# Patient Record
Sex: FEMALE | Race: WHITE | Employment: FULL TIME | URBAN - METROPOLITAN AREA
[De-identification: names, ages, dates, MRNs, and addresses within clinical notes are randomized per-mention and may not be internally consistent; named-entity substitution may affect disease eponyms.]

---

## 2017-12-01 ENCOUNTER — TELEPHONE (OUTPATIENT)
Dept: SURGERY | Age: 47
End: 2017-12-01

## 2019-05-31 LAB — CREATININE, EXTERNAL: 0.87

## 2020-06-26 VITALS
RESPIRATION RATE: 18 BRPM | TEMPERATURE: 97.9 F | HEIGHT: 63 IN | DIASTOLIC BLOOD PRESSURE: 86 MMHG | WEIGHT: 190 LBS | HEART RATE: 79 BPM | SYSTOLIC BLOOD PRESSURE: 140 MMHG | BODY MASS INDEX: 33.66 KG/M2 | OXYGEN SATURATION: 97 %

## 2020-06-26 PROBLEM — J30.1 ALLERGIC RHINITIS DUE TO POLLEN: Status: ACTIVE | Noted: 2020-06-26

## 2020-06-26 PROBLEM — J34.3 HYPERTROPHY OF NASAL TURBINATES: Status: ACTIVE | Noted: 2020-06-26

## 2020-06-26 PROBLEM — J34.2 DEVIATED NASAL SEPTUM: Status: ACTIVE | Noted: 2020-06-26

## 2020-07-16 ENCOUNTER — OP HISTORICAL/CONVERTED ENCOUNTER (OUTPATIENT)
Dept: OTHER | Age: 50
End: 2020-07-16

## 2020-08-11 ENCOUNTER — OFFICE VISIT (OUTPATIENT)
Dept: ENT CLINIC | Age: 50
End: 2020-08-11
Payer: COMMERCIAL

## 2020-08-11 VITALS
BODY MASS INDEX: 33.27 KG/M2 | SYSTOLIC BLOOD PRESSURE: 130 MMHG | DIASTOLIC BLOOD PRESSURE: 80 MMHG | HEART RATE: 106 BPM | WEIGHT: 187.8 LBS | OXYGEN SATURATION: 100 % | HEIGHT: 63 IN | TEMPERATURE: 98.2 F

## 2020-08-11 DIAGNOSIS — J34.3 HYPERTROPHY OF NASAL TURBINATES: ICD-10-CM

## 2020-08-11 DIAGNOSIS — J34.3 HYPERTROPHY OF BOTH INFERIOR NASAL TURBINATES: Primary | ICD-10-CM

## 2020-08-11 PROCEDURE — 99212 OFFICE O/P EST SF 10 MIN: CPT | Performed by: OTOLARYNGOLOGY

## 2020-08-11 RX ORDER — BUPROPION HYDROCHLORIDE 150 MG/1
TABLET, EXTENDED RELEASE ORAL 2 TIMES DAILY
COMMUNITY
End: 2020-11-10

## 2020-08-11 RX ORDER — AZELASTINE 1 MG/ML
SPRAY, METERED NASAL
Qty: 1 BOTTLE | Refills: 2 | Status: SHIPPED | OUTPATIENT
Start: 2020-08-11 | End: 2020-11-10

## 2020-08-11 RX ORDER — ARIPIPRAZOLE 5 MG/1
TABLET ORAL DAILY
COMMUNITY
End: 2020-10-26

## 2020-08-11 NOTE — PROGRESS NOTES
Subjective:    Micah Pennington   45 y.o.   1970      S/P Bilateral IT submucosal reduction. She reports improved nasal airway flow. No crusting or bleeding. Review of Systems  Review of Systems   Constitutional: Negative. HENT: Negative. Eyes: Negative. Respiratory: Negative. Cardiovascular: Negative. Gastrointestinal: Negative. Musculoskeletal: Negative. Skin: Negative. Neurological: Negative. Endo/Heme/Allergies: Negative. Psychiatric/Behavioral: Negative. Objective:     Visit Vitals  /80 (BP 1 Location: Left arm, BP Patient Position: Sitting)   Pulse (!) 106   Temp 98.2 °F (36.8 °C)   Ht 5' 3\" (1.6 m)   Wt 187 lb 12.8 oz (85.2 kg)   LMP 02/01/2014   SpO2 100%   BMI 33.27 kg/m²      Physical Exam  Vitals signs reviewed. Constitutional:       General: She is awake. Appearance: Normal appearance. She is well-developed. HENT:      Nose: Nose normal.      Comments: Bilateral inferior turbinate mucosal reduction seen on anterior rhinoscopy. No crust nor granulation tissue present. Mouth/Throat:      Lips: Pink. Mouth: Mucous membranes are moist.      Pharynx: Oropharynx is clear. Uvula midline. Neurological:      Mental Status: She is alert. Psychiatric:         Behavior: Behavior is cooperative. Assessment/Plan:   No diagnosis found.      Orders Placed This Encounter    ARIPiprazole (Abilify) 5 mg tablet    buPROPion SR (WELLBUTRIN SR) 150 mg SR tablet

## 2020-08-18 VITALS
HEIGHT: 63 IN | WEIGHT: 186 LBS | DIASTOLIC BLOOD PRESSURE: 68 MMHG | BODY MASS INDEX: 32.96 KG/M2 | SYSTOLIC BLOOD PRESSURE: 122 MMHG

## 2020-08-18 PROBLEM — E55.9 VITAMIN D DEFICIENCY: Status: ACTIVE | Noted: 2020-08-18

## 2020-08-18 PROBLEM — F32.A DEPRESSIVE DISORDER: Status: ACTIVE | Noted: 2020-08-18

## 2020-08-18 RX ORDER — TROSPIUM CHLORIDE 20 MG/1
20 TABLET, FILM COATED ORAL
COMMUNITY
End: 2021-07-15

## 2020-08-18 RX ORDER — TRAZODONE HYDROCHLORIDE 50 MG/1
TABLET ORAL
COMMUNITY
End: 2020-11-10

## 2020-10-02 RX ORDER — CETIRIZINE HCL 10 MG
TABLET ORAL
Qty: 90 TAB | Refills: 99 | Status: SHIPPED | OUTPATIENT
Start: 2020-10-02 | End: 2020-11-10

## 2020-10-26 RX ORDER — ARIPIPRAZOLE 5 MG/1
TABLET ORAL
Qty: 90 TAB | Refills: 1 | Status: SHIPPED | OUTPATIENT
Start: 2020-10-26 | End: 2020-11-10 | Stop reason: SDUPTHER

## 2020-10-26 RX ORDER — HYDROCHLOROTHIAZIDE 25 MG/1
TABLET ORAL
Qty: 90 TAB | Refills: 0 | Status: SHIPPED | OUTPATIENT
Start: 2020-10-26 | End: 2020-11-10 | Stop reason: SDUPTHER

## 2020-10-26 RX ORDER — BUPROPION HYDROCHLORIDE 150 MG/1
TABLET ORAL
Qty: 90 TAB | Refills: 1 | Status: SHIPPED | OUTPATIENT
Start: 2020-10-26 | End: 2020-11-10 | Stop reason: SDUPTHER

## 2020-11-10 ENCOUNTER — OFFICE VISIT (OUTPATIENT)
Dept: PRIMARY CARE CLINIC | Age: 50
End: 2020-11-10
Payer: COMMERCIAL

## 2020-11-10 VITALS
HEIGHT: 63 IN | TEMPERATURE: 97.5 F | OXYGEN SATURATION: 98 % | BODY MASS INDEX: 34.02 KG/M2 | WEIGHT: 192 LBS | SYSTOLIC BLOOD PRESSURE: 122 MMHG | HEART RATE: 95 BPM | RESPIRATION RATE: 16 BRPM | DIASTOLIC BLOOD PRESSURE: 84 MMHG

## 2020-11-10 DIAGNOSIS — K21.9 GASTROESOPHAGEAL REFLUX DISEASE WITHOUT ESOPHAGITIS: ICD-10-CM

## 2020-11-10 DIAGNOSIS — F51.01 PRIMARY INSOMNIA: ICD-10-CM

## 2020-11-10 DIAGNOSIS — Z23 ENCOUNTER FOR IMMUNIZATION: ICD-10-CM

## 2020-11-10 DIAGNOSIS — E66.9 CLASS 1 OBESITY WITH SERIOUS COMORBIDITY AND BODY MASS INDEX (BMI) OF 34.0 TO 34.9 IN ADULT, UNSPECIFIED OBESITY TYPE: ICD-10-CM

## 2020-11-10 DIAGNOSIS — F33.1 MODERATE EPISODE OF RECURRENT MAJOR DEPRESSIVE DISORDER (HCC): ICD-10-CM

## 2020-11-10 DIAGNOSIS — M25.512 ACUTE PAIN OF LEFT SHOULDER: ICD-10-CM

## 2020-11-10 DIAGNOSIS — E55.9 VITAMIN D DEFICIENCY: ICD-10-CM

## 2020-11-10 DIAGNOSIS — I10 ESSENTIAL HYPERTENSION: Primary | ICD-10-CM

## 2020-11-10 PROCEDURE — 99214 OFFICE O/P EST MOD 30 MIN: CPT | Performed by: NURSE PRACTITIONER

## 2020-11-10 PROCEDURE — 90756 CCIIV4 VACC ABX FREE IM: CPT

## 2020-11-10 PROCEDURE — 90471 IMMUNIZATION ADMIN: CPT | Performed by: NURSE PRACTITIONER

## 2020-11-10 RX ORDER — TEMAZEPAM 15 MG/1
15 CAPSULE ORAL
Qty: 30 CAP | Refills: 0 | Status: SHIPPED | OUTPATIENT
Start: 2020-11-10 | End: 2020-12-15 | Stop reason: SDUPTHER

## 2020-11-10 RX ORDER — OMEPRAZOLE 40 MG/1
40 CAPSULE, DELAYED RELEASE ORAL DAILY
Qty: 90 CAP | Refills: 1 | Status: SHIPPED | OUTPATIENT
Start: 2020-11-10 | End: 2021-07-15 | Stop reason: SDUPTHER

## 2020-11-10 RX ORDER — HYDROCHLOROTHIAZIDE 25 MG/1
TABLET ORAL
Qty: 90 TAB | Refills: 1 | Status: SHIPPED | OUTPATIENT
Start: 2020-11-10 | End: 2021-07-15 | Stop reason: SDUPTHER

## 2020-11-10 RX ORDER — ARIPIPRAZOLE 5 MG/1
5 TABLET ORAL DAILY
Qty: 90 TAB | Refills: 1 | Status: SHIPPED | OUTPATIENT
Start: 2020-11-10 | End: 2021-07-15 | Stop reason: SDUPTHER

## 2020-11-10 RX ORDER — BUPROPION HYDROCHLORIDE 150 MG/1
TABLET ORAL
Qty: 90 TAB | Refills: 1 | Status: SHIPPED | OUTPATIENT
Start: 2020-11-10 | End: 2021-07-15 | Stop reason: SDUPTHER

## 2020-11-10 NOTE — PROGRESS NOTES
HISTORY OF PRESENT ILLNESS  Micah Bernstein is a 45 y.o. female presents to the office for medication refill and lab work  Micah presents to the office for medication refill. 1. Depression: Patient reported that her abilify is helping her with her symptoms. She is also taking wellbutrin. Patient this combination is working well for her mood. 2. Hypertension: Patients BP is well controlled on HCTZ. 3. GERD: Patient reported that her acid reflux is well controlled on omeprazole. Hx of hiatal hernia. 4. Allergic Rhinitis: Patient reported zyrtec works well for her allergies. Has sinus issues and has had sinus surgery in past.     5. Insomnia: Patient reported that trazodone is not working for her insomnia so she stopped taking it. Patient notes that she is sleeping 7 hours a night and feels pretty well rested from this sleep. She notes that she struggles to sleep some nights. 6. Vitamin D:  Patient has not been using her supplement consistently. Has hx of low vitamin D.     7. Shoulder pain:  Patient c/o left shoulder pain for months. Has been seen at urgent care center with negative XR. Has been on nsaids and steroids with some relief but the pain returns. Reduce ROM, unable to lift arm above shoulder height. Denies known injury.    Vitals:    11/10/20 1457   BP: 122/84   Pulse: 95   Resp: 16   Temp: 97.5 °F (36.4 °C)   TempSrc: Temporal   SpO2: 98%   Weight: 192 lb (87.1 kg)   Height: 5' 3\" (1.6 m)     Patient Active Problem List   Diagnosis Code    HTN (hypertension) I10    Morbid obesity ([facility] [address] E66.01    GERD (gastroesophageal reflux disease) K21.9    DJD (degenerative joint disease) M19.90    Bariatric surgery status Z98.84    Hypertrophy of nasal turbinates J34.3    Allergic rhinitis due to pollen J30.1    Deviated nasal septum J34.2    Depressive disorder F32.9    Vitamin D deficiency E55.9    Primary insomnia F51.01    Moderate episode of recurrent major depressive disorder [facility]) F33.1     Patient Active Problem List    Diagnosis Date Noted    Primary insomnia 11/10/2020    Moderate episode of recurrent major depressive disorder ([facility] [address] 11/10/2020    Depressive disorder 08/18/2020    Vitamin D deficiency 08/18/2020    Hypertrophy of nasal turbinates 06/26/2020    Allergic rhinitis due to pollen 06/26/2020    Deviated nasal septum 06/26/2020    Bariatric surgery status 02/24/2015    Morbid obesity ([facility] [address] 06/23/2013    GERD (gastroesophageal reflux disease) 06/23/2013    DJD (degenerative joint disease) 06/23/2013    HTN (hypertension)      Current Outpatient Medications   Medication Sig Dispense Refill    temazepam (RESTORIL) 15 mg capsule Take 1 Cap by mouth nightly as needed for Sleep. Max Daily Amount: 15 mg. 30 Cap 0    hydroCHLOROthiazide (HYDRODIURIL) 25 mg tablet TAKE 1 TABLET BY MOUTH EVERY DAY 90 Tab 1    buPROPion XL (WELLBUTRIN XL) 150 mg tablet TAKE 1 TABLET BY MOUTH EVERY DAY 90 Tab 1    ARIPiprazole (ABILIFY) 5 mg tablet Take 1 Tab by mouth daily. 90 Tab 1    omeprazole (PRILOSEC) 40 mg capsule Take 1 Cap by mouth daily. 90 Cap 1    trospium (SANCTURA) 20 mg tablet Take 20 mg by mouth Before breakfast, lunch, and dinner.  Calcium-Cholecalciferol, D3, (CALCIUM 600 WITH VITAMIN D3) 600 mg(1,500mg) -400 unit cap Take  by mouth daily.  ergocalciferol (VITAMIN D2) 50,000 unit capsule Take 50,000 Units by mouth every seven (7) days. EVERY Wednesday.        No Known Allergies  Past Medical History:   Diagnosis Date    Allergic rhinitis due to pollen 6/26/2020    Anemia NEC     Anxiety     Depression     Depressive disorder 8/18/2020    Deviated nasal septum 6/26/2020    GERD (gastroesophageal reflux disease)     HTN (hypertension)     Hypertrophy of nasal turbinates 6/26/2020    Morbid obesity (HCC)     Nasal sinus congestion 6/26/2020    Other ill-defined conditions(799.89)     HIATAL HERNIA    Psychiatric disorder     depression    Vitamin D deficiency 8/18/2020     Past Surgical History:   Procedure Laterality Date    HX GASTRECTOMY  1/30/14    lap sleeve gastrectomy by Dr Mary Lira (ABOUT 79 LBS. WEIGHT LOSS)    HX GI      upper endoscopy    HX HEENT      RHINOPLASTY    HX HERNIA REPAIR  1/30/14    lap repair paraesophageal hernia by Dr Cricket Tran  5/27/16    by Dr Marika Mcleod Right     foot/ PLANTAR FASCITIS    HX OTHER SURGICAL      sinus     Family History   Problem Relation Age of Onset    Cancer Mother         lung    Hypertension Father     Heart Disease Father         PACEMAKER    Diabetes Other     Anesth Problems Neg Hx      Social History     Tobacco Use    Smoking status: Never Smoker    Smokeless tobacco: Never Used   Substance Use Topics    Alcohol use: Yes     Comment: 2 MONTH           Review of Systems   Constitutional: Negative for malaise/fatigue and weight loss. Eyes: Negative for blurred vision and double vision. Respiratory: Negative for shortness of breath. Cardiovascular: Negative for chest pain, palpitations and leg swelling. Gastrointestinal: Negative for diarrhea, nausea and vomiting. Musculoskeletal: Positive for joint pain (left shoulder). Negative for neck pain. Skin: Negative. Neurological: Negative for dizziness, weakness and headaches. Psychiatric/Behavioral: Negative for depression. The patient has insomnia. The patient is not nervous/anxious. Physical Exam  Constitutional:       Appearance: Normal appearance. She is normal weight. HENT:      Head: Normocephalic. Eyes:      Extraocular Movements: Extraocular movements intact. Conjunctiva/sclera: Conjunctivae normal.      Pupils: Pupils are equal, round, and reactive to light. Neck:      Musculoskeletal: Normal range of motion and neck supple. Cardiovascular:      Rate and Rhythm: Normal rate and regular rhythm. Pulses: Normal pulses.       Heart sounds: Normal heart sounds. Pulmonary:      Effort: Pulmonary effort is normal.      Breath sounds: Normal breath sounds. Musculoskeletal: Normal range of motion. Skin:     General: Skin is warm and dry. Neurological:      General: No focal deficit present. Mental Status: She is alert and oriented to person, place, and time. Psychiatric:         Mood and Affect: Mood normal.           ASSESSMENT and PLAN    Diagnoses and all orders for this visit:    1. Essential hypertension  Comments:  stable on HCTZ. Orders:  -     hydroCHLOROthiazide (HYDRODIURIL) 25 mg tablet; TAKE 1 TABLET BY MOUTH EVERY DAY  -     METABOLIC PANEL, COMPREHENSIVE    2. Gastroesophageal reflux disease without esophagitis  -     omeprazole (PRILOSEC) 40 mg capsule; Take 1 Cap by mouth daily.  -     CBC WITH AUTOMATED DIFF    3. Primary insomnia  Comments:  Trial temazepam for sleep. Has tried and failed trazadone and OTC meds. Orders:  -     temazepam (RESTORIL) 15 mg capsule; Take 1 Cap by mouth nightly as needed for Sleep. Max Daily Amount: 15 mg.    4. Moderate episode of recurrent major depressive disorder ([facility] [address]  Comments:  continue wellbutrin and abilify. Orders:  -     buPROPion XL (WELLBUTRIN XL) 150 mg tablet; TAKE 1 TABLET BY MOUTH EVERY DAY  -     ARIPiprazole (ABILIFY) 5 mg tablet; Take 1 Tab by mouth daily.  -     CBC WITH AUTOMATED DIFF    5. Vitamin D deficiency  -     VITAMIN D, 25 HYDROXY    6. Class 1 obesity with serious comorbidity and body mass index (BMI) of 34.0 to 34.9 in adult, unspecified obesity type  -     LIPID PANEL    7. Encounter for immunization  -     INFLUENZA VACCINE (CCIIV4 VACCINE ANTIBIO FREE 0.5 ML)  -     [place] IMMUNIZ ADMIN,1 SINGLE/COMB VAC/TOXOID    8. Acute pain of left shoulder  Comments:  [facility], she will make her own appointment.         Last PDMP Maile Dietz as Reviewed:  Review User Review Instant Review Result   Andrés Jeanmarie 11/10/2020  3:19 PM Reviewed PDMP [1] Jam Dukes, NP

## 2020-11-11 LAB
25(OH)D3+25(OH)D2 SERPL-MCNC: 23.6 NG/ML (ref 30–100)
ALBUMIN SERPL-MCNC: 4.5 G/DL (ref 3.8–4.9)
ALBUMIN/GLOB SERPL: 1.7 {RATIO} (ref 1.2–2.2)
ALP SERPL-CCNC: 98 IU/L (ref 39–117)
ALT SERPL-CCNC: 23 IU/L (ref 0–32)
AST SERPL-CCNC: 23 IU/L (ref 0–40)
BASOPHILS # BLD AUTO: 0 X10E3/UL (ref 0–0.2)
BASOPHILS NFR BLD AUTO: 1 %
BILIRUB SERPL-MCNC: 0.6 MG/DL (ref 0–1.2)
BUN SERPL-MCNC: 14 MG/DL (ref 6–24)
BUN/CREAT SERPL: 14 (ref 9–23)
CALCIUM SERPL-MCNC: 9.7 MG/DL (ref 8.7–10.2)
CHLORIDE SERPL-SCNC: 100 MMOL/L (ref 96–106)
CHOLEST SERPL-MCNC: 199 MG/DL (ref 100–199)
CO2 SERPL-SCNC: 26 MMOL/L (ref 20–29)
CREAT SERPL-MCNC: 0.99 MG/DL (ref 0.57–1)
EOSINOPHIL # BLD AUTO: 0.1 X10E3/UL (ref 0–0.4)
EOSINOPHIL NFR BLD AUTO: 1 %
ERYTHROCYTE [DISTWIDTH] IN BLOOD BY AUTOMATED COUNT: 13.5 % (ref 11.7–15.4)
GLOBULIN SER CALC-MCNC: 2.7 G/DL (ref 1.5–4.5)
GLUCOSE SERPL-MCNC: 90 MG/DL (ref 65–99)
HCT VFR BLD AUTO: 39.1 % (ref 34–46.6)
HDLC SERPL-MCNC: 112 MG/DL
HGB BLD-MCNC: 13.5 G/DL (ref 11.1–15.9)
IMM GRANULOCYTES # BLD AUTO: 0 X10E3/UL (ref 0–0.1)
IMM GRANULOCYTES NFR BLD AUTO: 0 %
LDLC SERPL CALC-MCNC: 73 MG/DL (ref 0–99)
LYMPHOCYTES # BLD AUTO: 2.1 X10E3/UL (ref 0.7–3.1)
LYMPHOCYTES NFR BLD AUTO: 40 %
MCH RBC QN AUTO: 32.5 PG (ref 26.6–33)
MCHC RBC AUTO-ENTMCNC: 34.5 G/DL (ref 31.5–35.7)
MCV RBC AUTO: 94 FL (ref 79–97)
MONOCYTES # BLD AUTO: 0.5 X10E3/UL (ref 0.1–0.9)
MONOCYTES NFR BLD AUTO: 9 %
NEUTROPHILS # BLD AUTO: 2.5 X10E3/UL (ref 1.4–7)
NEUTROPHILS NFR BLD AUTO: 49 %
PLATELET # BLD AUTO: 267 X10E3/UL (ref 150–450)
POTASSIUM SERPL-SCNC: 3.9 MMOL/L (ref 3.5–5.2)
PROT SERPL-MCNC: 7.2 G/DL (ref 6–8.5)
RBC # BLD AUTO: 4.15 X10E6/UL (ref 3.77–5.28)
SODIUM SERPL-SCNC: 140 MMOL/L (ref 134–144)
TRIGL SERPL-MCNC: 82 MG/DL (ref 0–149)
VLDLC SERPL CALC-MCNC: 14 MG/DL (ref 5–40)
WBC # BLD AUTO: 5.1 X10E3/UL (ref 3.4–10.8)

## 2020-12-15 ENCOUNTER — PATIENT MESSAGE (OUTPATIENT)
Dept: PRIMARY CARE CLINIC | Age: 50
End: 2020-12-15

## 2020-12-15 DIAGNOSIS — F51.01 PRIMARY INSOMNIA: ICD-10-CM

## 2020-12-15 RX ORDER — TEMAZEPAM 15 MG/1
15 CAPSULE ORAL
Qty: 90 CAP | Refills: 1 | Status: SHIPPED | OUTPATIENT
Start: 2020-12-15 | End: 2021-07-15 | Stop reason: SDUPTHER

## 2020-12-15 NOTE — TELEPHONE ENCOUNTER
Sent in 90 day supply. Last PDMP Ping Hawley as Reviewed: 
Review User Review Instant Review Result Haley Rodriges 12/15/2020  1:12 PM Reviewed PDMP [1]

## 2020-12-15 NOTE — TELEPHONE ENCOUNTER
From: Tiffany Antunez To: Michael Perez NP Sent: 12/15/2020 11:12 AM EST Subject: Prescription Question The Temazepam 15mg is working. Could you send in a 90 supply. Thank you [facility]

## 2021-06-30 DIAGNOSIS — I10 ESSENTIAL HYPERTENSION: ICD-10-CM

## 2021-06-30 DIAGNOSIS — K21.9 GASTROESOPHAGEAL REFLUX DISEASE WITHOUT ESOPHAGITIS: ICD-10-CM

## 2021-06-30 RX ORDER — HYDROCHLOROTHIAZIDE 25 MG/1
TABLET ORAL
Qty: 90 TABLET | Refills: 1 | OUTPATIENT
Start: 2021-06-30

## 2021-06-30 RX ORDER — OMEPRAZOLE 40 MG/1
CAPSULE, DELAYED RELEASE ORAL
Qty: 90 CAPSULE | Refills: 1 | OUTPATIENT
Start: 2021-06-30

## 2021-07-15 ENCOUNTER — VIRTUAL VISIT (OUTPATIENT)
Dept: PRIMARY CARE CLINIC | Age: 51
End: 2021-07-15
Payer: COMMERCIAL

## 2021-07-15 DIAGNOSIS — K21.9 GASTROESOPHAGEAL REFLUX DISEASE WITHOUT ESOPHAGITIS: ICD-10-CM

## 2021-07-15 DIAGNOSIS — F51.01 PRIMARY INSOMNIA: ICD-10-CM

## 2021-07-15 DIAGNOSIS — F33.1 MODERATE EPISODE OF RECURRENT MAJOR DEPRESSIVE DISORDER (HCC): ICD-10-CM

## 2021-07-15 DIAGNOSIS — I10 ESSENTIAL HYPERTENSION: Primary | ICD-10-CM

## 2021-07-15 DIAGNOSIS — E55.9 VITAMIN D DEFICIENCY: ICD-10-CM

## 2021-07-15 DIAGNOSIS — Z53.20 COLON CANCER SCREENING DECLINED: ICD-10-CM

## 2021-07-15 PROCEDURE — 99214 OFFICE O/P EST MOD 30 MIN: CPT | Performed by: NURSE PRACTITIONER

## 2021-07-15 RX ORDER — HYDROCHLOROTHIAZIDE 25 MG/1
TABLET ORAL
Qty: 90 TABLET | Refills: 1 | Status: SHIPPED | OUTPATIENT
Start: 2021-07-15

## 2021-07-15 RX ORDER — OMEPRAZOLE 40 MG/1
40 CAPSULE, DELAYED RELEASE ORAL DAILY
Qty: 90 CAPSULE | Refills: 1 | Status: SHIPPED | OUTPATIENT
Start: 2021-07-15 | End: 2021-10-18

## 2021-07-15 RX ORDER — TEMAZEPAM 15 MG/1
15 CAPSULE ORAL
Qty: 90 CAPSULE | Refills: 1 | Status: SHIPPED | OUTPATIENT
Start: 2021-07-15

## 2021-07-15 RX ORDER — ARIPIPRAZOLE 5 MG/1
5 TABLET ORAL DAILY
Qty: 90 TABLET | Refills: 1 | Status: SHIPPED | OUTPATIENT
Start: 2021-07-15

## 2021-07-15 RX ORDER — BUPROPION HYDROCHLORIDE 150 MG/1
TABLET ORAL
Qty: 90 TABLET | Refills: 1 | Status: SHIPPED | OUTPATIENT
Start: 2021-07-15

## 2021-07-15 NOTE — PROGRESS NOTES
HISTORY OF PRESENT ILLNESS  Micah Can is a 45 y.o. female presents via telemedicine for medication refill. 1. Depression: Patient reported that her abilify is helping her with her symptoms. She is also taking wellbutrin. Patient this combination is working well for her mood. 2. Hypertension: Patients BP is well controlled on HCTZ. 3. GERD: Patient reported that her acid reflux is well controlled on omeprazole. Hx of hiatal hernia. 4. Allergic Rhinitis: Patient reported zyrtec works well for her allergies. Has sinus issues and has had sinus surgery in past.     5. Insomnia: Temazepam is working well for patient for sleep. Used trazodone in the past.     6. Vitamin D:  Patient has been using once weekly vitamin D2 50,000 units. Feels good, denies fatigue. She is moving to [place] near [place] at the end of this month. Will establish care with a new PCP once she moves. There were no vitals filed for this visit.   Patient Active Problem List   Diagnosis Code    HTN (hypertension) I10    Morbid obesity ([facility] [address] E66.01    GERD (gastroesophageal reflux disease) K21.9    DJD (degenerative joint disease) M19.90    Bariatric surgery status Z98.84    Hypertrophy of nasal turbinates J34.3    Allergic rhinitis due to pollen J30.1    Deviated nasal septum J34.2    Depressive disorder F32.9    Vitamin D deficiency E55.9    Primary insomnia F51.01    Moderate episode of recurrent major depressive disorder ([facility] [address] F33.1     Patient Active Problem List    Diagnosis Date Noted    Primary insomnia 11/10/2020    Moderate episode of recurrent major depressive disorder ([address] 11/10/2020    Depressive disorder 08/18/2020    Vitamin D deficiency 08/18/2020    Hypertrophy of nasal turbinates 06/26/2020    Allergic rhinitis due to pollen 06/26/2020    Deviated nasal septum 06/26/2020    Bariatric surgery status 02/24/2015    Morbid obesity ([facility] [address] 06/23/2013    GERD (gastroesophageal reflux disease) 06/23/2013    DJD (degenerative joint disease) 06/23/2013    HTN (hypertension)      Current Outpatient Medications   Medication Sig Dispense Refill    hydroCHLOROthiazide (HYDRODIURIL) 25 mg tablet TAKE 1 TABLET BY MOUTH EVERY DAY 90 Tablet 1    omeprazole (PRILOSEC) 40 mg capsule Take 1 Capsule by mouth daily. 90 Capsule 1    temazepam (RESTORIL) 15 mg capsule Take 1 Capsule by mouth nightly as needed for Sleep. Max Daily Amount: 15 mg. 90 Capsule 1    ARIPiprazole (ABILIFY) 5 mg tablet Take 1 Tablet by mouth daily. 90 Tablet 1    buPROPion XL (WELLBUTRIN XL) 150 mg tablet TAKE 1 TABLET BY MOUTH EVERY DAY 90 Tablet 1    Calcium-Cholecalciferol, D3, (CALCIUM 600 WITH VITAMIN D3) 600 mg(1,500mg) -400 unit cap Take  by mouth daily.  ergocalciferol (VITAMIN D2) 50,000 unit capsule Take 50,000 Units by mouth every seven (7) days. EVERY Wednesday. No Known Allergies  Past Medical History:   Diagnosis Date    Allergic rhinitis due to pollen 6/26/2020    Anemia NEC     Anxiety     Depression     Depressive disorder 8/18/2020    Deviated nasal septum 6/26/2020    GERD (gastroesophageal reflux disease)     HTN (hypertension)     Hypertrophy of nasal turbinates 6/26/2020    Morbid obesity (HCC)     Nasal sinus congestion 6/26/2020    Other ill-defined conditions(799.89)     HIATAL HERNIA    Psychiatric disorder     depression    Vitamin D deficiency 8/18/2020     Past Surgical History:   Procedure Laterality Date    HX GASTRECTOMY  1/30/14    lap sleeve gastrectomy by Dr Donal Nunez (ABOUT 70 LBS.  WEIGHT LOSS)    HX GI      upper endoscopy    HX HEENT      RHINOPLASTY    HX HERNIA REPAIR  1/30/14    lap repair paraesophageal hernia by Dr Aggie Celeste  5/27/16    by Dr Sanjiv Banks Right     foot/ PLANTAR FASCITIS    HX OTHER SURGICAL      sinus     Family History   Problem Relation Age of Onset    Cancer Mother         lung    Hypertension Father     Heart Disease Father         PACEMAKER    Diabetes Other     Anesth Problems Neg Hx      Social History     Tobacco Use    Smoking status: Never Smoker    Smokeless tobacco: Never Used   Substance Use Topics    Alcohol use: Yes     Comment: 2 MONTH           Review of Systems   Constitutional: Negative for malaise/fatigue and weight loss. Eyes: Negative for blurred vision and double vision. Respiratory: Negative for cough and shortness of breath. Cardiovascular: Negative for chest pain and palpitations. Gastrointestinal: Negative for heartburn, nausea and vomiting. Genitourinary: Negative. Musculoskeletal: Negative for back pain, joint pain, myalgias and neck pain. Neurological: Negative for dizziness, tingling and headaches. Psychiatric/Behavioral: Negative for depression and suicidal ideas. The patient does not have insomnia. Physical Exam  Constitutional:       Appearance: Normal appearance. HENT:      Head: Normocephalic. Eyes:      Conjunctiva/sclera: Conjunctivae normal.   Pulmonary:      Effort: Pulmonary effort is normal.   Skin:     General: Skin is warm and dry. Neurological:      Mental Status: She is alert and oriented to person, place, and time. Psychiatric:         Mood and Affect: Mood normal.         Behavior: Behavior normal.           ASSESSMENT and PLAN  Diagnoses and all orders for this visit:    1. Essential hypertension  Comments:  stable on HCTZ. Orders:  -     hydroCHLOROthiazide (HYDRODIURIL) 25 mg tablet; TAKE 1 TABLET BY MOUTH EVERY DAY  -     CBC WITH AUTOMATED DIFF  -     METABOLIC PANEL, COMPREHENSIVE    2. Gastroesophageal reflux disease without esophagitis  -     omeprazole (PRILOSEC) 40 mg capsule; Take 1 Capsule by mouth daily.  -     CBC WITH AUTOMATED DIFF  -     METABOLIC PANEL, COMPREHENSIVE    3. Primary insomnia  Comments:  well controlled on temazepam.   Orders:  -     temazepam (RESTORIL) 15 mg capsule;  Take 1 Capsule by mouth nightly as needed for Sleep. Max Daily Amount: 15 mg.    4. Moderate episode of recurrent major depressive disorder ([facility] [address]  Comments:  continue wellbutrin and abilify. Orders:  -     ARIPiprazole (ABILIFY) 5 mg tablet; Take 1 Tablet by mouth daily. -     buPROPion XL (WELLBUTRIN XL) 150 mg tablet; TAKE 1 TABLET BY MOUTH EVERY DAY  -     CBC WITH AUTOMATED DIFF    5. Vitamin D deficiency  Comments:  check vitamin D, should switch to daily OTC vitamin D supplement. Orders:  -     VITAMIN D, 25 HYDROXY    6. Colon cancer screening declined  Comments:  Has not had colon cancer screening, offered today but she prefers to get establish care with GI once she moves to [place]. Last PDMP Dominick File as Reviewed:  Review User Review Instant Review Result   Chaptico Gayathri 7/15/2021  1:06 PM Reviewed PDMP [1]       Micah Diop, who was evaluated through a synchronous (real-time) audio-video encounter, and/or her healthcare decision maker, is aware that it is a billable service, with coverage as determined by her insurance carrier. She provided verbal consent to proceed: Yes, and patient identification was verified. It was conducted pursuant to the emergency declaration under the [address] [place] [address] authority and the [employer] General Act. A caregiver was present when appropriate. Ability to conduct physical exam was limited. I was at home. The patient was at home. Femi Bernard is a 45 y.o. female being evaluated by a Virtual Visit (video visit) encounter to address concerns as mentioned above. A caregiver was present when appropriate. Due to this being a TeleHealth encounter (During IHGIL-01 public health emergency), evaluation of the following organ systems was limited: Vitals/Constitutional/EENT/Resp/CV/GI//MS/Neuro/Skin/Heme-Lymph-Imm.   Pursuant to the emergency declaration under the [address] Emergencies Act, 1135 waiver authority and the Coronavirus Preparedness and Response Supplemental Appropriations Act, this Virtual Visit was conducted with patient's (and/or legal guardian's) consent, to reduce the risk of exposure to COVID-19 and provide necessary medical care. Services were provided through a video synchronous discussion virtually to substitute for in-person encounter. --Maxi Thayer NP on 7/15/2021 at 12:59 PM    An electronic signature was used to authenticate this note.

## 2021-07-15 NOTE — PROGRESS NOTES
Chief Complaint   Patient presents with    Follow Up Chronic Condition     pt is aksing for refills on all meds below; list has been reviewed with pt     Visit Vitals  LMP 02/01/2014     1. Have you been to the ER, urgent care clinic since your last visit? Hospitalized since your last visit?no    2. Have you seen or consulted any other health care providers outside of the [address] [place] [address] since your last visit? Include any pap smears or colon screening.  no

## 2021-07-22 LAB
25(OH)D3+25(OH)D2 SERPL-MCNC: 29.1 NG/ML (ref 30–100)
ALBUMIN SERPL-MCNC: 4.2 G/DL (ref 3.8–4.9)
ALBUMIN/GLOB SERPL: 1.6 {RATIO} (ref 1.2–2.2)
ALP SERPL-CCNC: 88 IU/L (ref 48–121)
ALT SERPL-CCNC: 14 IU/L (ref 0–32)
AST SERPL-CCNC: 19 IU/L (ref 0–40)
BASOPHILS # BLD AUTO: 0 X10E3/UL (ref 0–0.2)
BASOPHILS NFR BLD AUTO: 1 %
BILIRUB SERPL-MCNC: 0.5 MG/DL (ref 0–1.2)
BUN SERPL-MCNC: 16 MG/DL (ref 6–24)
BUN/CREAT SERPL: 17 (ref 9–23)
CALCIUM SERPL-MCNC: 9.4 MG/DL (ref 8.7–10.2)
CHLORIDE SERPL-SCNC: 103 MMOL/L (ref 96–106)
CO2 SERPL-SCNC: 29 MMOL/L (ref 20–29)
CREAT SERPL-MCNC: 0.93 MG/DL (ref 0.57–1)
EOSINOPHIL # BLD AUTO: 0.1 X10E3/UL (ref 0–0.4)
EOSINOPHIL NFR BLD AUTO: 2 %
ERYTHROCYTE [DISTWIDTH] IN BLOOD BY AUTOMATED COUNT: 13 % (ref 11.7–15.4)
GLOBULIN SER CALC-MCNC: 2.7 G/DL (ref 1.5–4.5)
GLUCOSE SERPL-MCNC: 89 MG/DL (ref 65–99)
HCT VFR BLD AUTO: 37.9 % (ref 34–46.6)
HGB BLD-MCNC: 12.8 G/DL (ref 11.1–15.9)
IMM GRANULOCYTES # BLD AUTO: 0 X10E3/UL (ref 0–0.1)
IMM GRANULOCYTES NFR BLD AUTO: 0 %
LYMPHOCYTES # BLD AUTO: 1.8 X10E3/UL (ref 0.7–3.1)
LYMPHOCYTES NFR BLD AUTO: 37 %
MCH RBC QN AUTO: 31.4 PG (ref 26.6–33)
MCHC RBC AUTO-ENTMCNC: 33.8 G/DL (ref 31.5–35.7)
MCV RBC AUTO: 93 FL (ref 79–97)
MONOCYTES # BLD AUTO: 0.4 X10E3/UL (ref 0.1–0.9)
MONOCYTES NFR BLD AUTO: 9 %
NEUTROPHILS # BLD AUTO: 2.6 X10E3/UL (ref 1.4–7)
NEUTROPHILS NFR BLD AUTO: 51 %
PLATELET # BLD AUTO: 233 X10E3/UL (ref 150–450)
POTASSIUM SERPL-SCNC: 4.2 MMOL/L (ref 3.5–5.2)
PROT SERPL-MCNC: 6.9 G/DL (ref 6–8.5)
RBC # BLD AUTO: 4.07 X10E6/UL (ref 3.77–5.28)
SODIUM SERPL-SCNC: 145 MMOL/L (ref 134–144)
WBC # BLD AUTO: 5 X10E3/UL (ref 3.4–10.8)

## 2021-10-18 DIAGNOSIS — K21.9 GASTROESOPHAGEAL REFLUX DISEASE WITHOUT ESOPHAGITIS: ICD-10-CM

## 2021-10-18 RX ORDER — OMEPRAZOLE 40 MG/1
CAPSULE, DELAYED RELEASE ORAL
Qty: 90 CAPSULE | Refills: 1 | Status: SHIPPED | OUTPATIENT
Start: 2021-10-18

## 2022-03-18 PROBLEM — E55.9 VITAMIN D DEFICIENCY: Status: ACTIVE | Noted: 2020-08-18

## 2022-03-19 PROBLEM — J34.2 DEVIATED NASAL SEPTUM: Status: ACTIVE | Noted: 2020-06-26

## 2022-03-19 PROBLEM — F51.01 PRIMARY INSOMNIA: Status: ACTIVE | Noted: 2020-11-10

## 2022-03-19 PROBLEM — F33.1 MODERATE EPISODE OF RECURRENT MAJOR DEPRESSIVE DISORDER (HCC): Status: ACTIVE | Noted: 2020-11-10

## 2022-03-19 PROBLEM — J30.1 ALLERGIC RHINITIS DUE TO POLLEN: Status: ACTIVE | Noted: 2020-06-26

## 2022-03-19 PROBLEM — J34.3 HYPERTROPHY OF NASAL TURBINATES: Status: ACTIVE | Noted: 2020-06-26

## 2022-03-19 PROBLEM — F32.A DEPRESSIVE DISORDER: Status: ACTIVE | Noted: 2020-08-18

## 2023-05-22 RX ORDER — ARIPIPRAZOLE 5 MG/1
5 TABLET ORAL DAILY
COMMUNITY
Start: 2021-07-15

## 2023-05-22 RX ORDER — OMEPRAZOLE 40 MG/1
1 CAPSULE, DELAYED RELEASE ORAL DAILY
COMMUNITY
Start: 2021-10-18

## 2023-05-22 RX ORDER — BUPROPION HYDROCHLORIDE 150 MG/1
1 TABLET ORAL DAILY
COMMUNITY
Start: 2021-07-15

## 2023-05-22 RX ORDER — HYDROCHLOROTHIAZIDE 25 MG/1
1 TABLET ORAL DAILY
COMMUNITY
Start: 2021-07-15

## 2023-05-22 RX ORDER — ERGOCALCIFEROL 1.25 MG/1
50000 CAPSULE ORAL
COMMUNITY

## 2023-05-22 RX ORDER — TEMAZEPAM 15 MG/1
15 CAPSULE ORAL
COMMUNITY
Start: 2021-07-15

## 2023-11-17 ENCOUNTER — TELEPHONE (OUTPATIENT)
Age: 53
End: 2023-11-17

## 2023-11-17 NOTE — TELEPHONE ENCOUNTER
Called patient 11/17/23  to screen and inform them about ASMBS recommendations that  patients  who have undergone Sleeve Gastrectomy > 3 years post op have routine surveillance upper endoscopy to evaluate for any esophageal changes   LM with Pt VM:  yes  Spoke with patient.   no  Surgeon:  Dr. Dc Bajwa  Date of Surgery:  1/30/14    BEN Nunes